# Patient Record
Sex: FEMALE | Race: BLACK OR AFRICAN AMERICAN | Employment: FULL TIME | RURAL
[De-identification: names, ages, dates, MRNs, and addresses within clinical notes are randomized per-mention and may not be internally consistent; named-entity substitution may affect disease eponyms.]

---

## 2018-11-12 ENCOUNTER — OFFICE VISIT (OUTPATIENT)
Dept: FAMILY MEDICINE CLINIC | Age: 28
End: 2018-11-12

## 2018-11-12 VITALS
OXYGEN SATURATION: 99 % | TEMPERATURE: 98.3 F | DIASTOLIC BLOOD PRESSURE: 66 MMHG | HEART RATE: 76 BPM | WEIGHT: 190 LBS | BODY MASS INDEX: 30.53 KG/M2 | HEIGHT: 66 IN | RESPIRATION RATE: 16 BRPM | SYSTOLIC BLOOD PRESSURE: 114 MMHG

## 2018-11-12 DIAGNOSIS — Z76.89 ENCOUNTER TO ESTABLISH CARE: ICD-10-CM

## 2018-11-12 DIAGNOSIS — L50.9 URTICARIA: Primary | ICD-10-CM

## 2018-11-12 DIAGNOSIS — Z00.00 ROUTINE PHYSICAL EXAMINATION: ICD-10-CM

## 2018-11-12 NOTE — PATIENT INSTRUCTIONS
Hives: Care Instructions Your Care Instructions Hives are raised, red, itchy patches of skin. They are also called wheals or welts. They usually have red borders and pale centers. Hives range in size from ¼ inch to 3 inches or more across. They may seem to move from place to place on the skin. Several hives may form a large area of raised, red skin. You can get hives after an insect sting, after taking medicine or eating certain foods, or because of infection or stress. Other causes include plants, things you breathe in, makeup, heat, cold, sunlight, and latex. You cannot spread hives to other people. Hives may last a few minutes or a few days, but a single spot may last less than 36 hours. Follow-up care is a key part of your treatment and safety. Be sure to make and go to all appointments, and call your doctor if you are having problems. It's also a good idea to know your test results and keep a list of the medicines you take. How can you care for yourself at home? · Avoid whatever you think may have caused your hives, such as a certain food or medicine. However, you may not know the cause. · Put a cool, wet towel on the area to relieve itching. · Take an over-the-counter antihistamine, such as diphenhydramine (Benadryl), cetirizine (Zyrtec), or loratadine (Claritin), to help stop the hives and calm the itching. Read and follow directions on the label. These medicines can make you feel sleepy. Do not drive while using them. · Stay away from strong soaps, detergents, and chemicals. These can make itching worse. When should you call for help? Call 911 anytime you think you may need emergency care. For example, call if: 
  · You have symptoms of a severe allergic reaction. These may include: 
? Sudden raised, red areas (hives) all over your body. ? Swelling of the throat, mouth, lips, or tongue. ? Trouble breathing. ? Passing out (losing consciousness).  Or you may feel very lightheaded or suddenly feel weak, confused, or restless.  
 Call your doctor now or seek immediate medical care if: 
  · You have symptoms of an allergic reaction, such as: ? A rash or hives (raised, red areas on the skin). ? Itching. ? Swelling. ? Belly pain, nausea, or vomiting.  
  · You get hives after you start a new medicine.  
  · Hives have not gone away after 24 hours.  
 Watch closely for changes in your health, and be sure to contact your doctor if: 
  · You do not get better as expected. Where can you learn more? Go to http://mason-rosa.info/. Enter K926 in the search box to learn more about \"Hives: Care Instructions. \" Current as of: November 20, 2017 Content Version: 11.8 © 4447-8542 [employer]. Care instructions adapted under license by [employer] (which disclaims liability or warranty for this information). If you have questions about a medical condition or this instruction, always ask your healthcare professional. [address] any warranty or liability for your use of this information.

## 2018-11-12 NOTE — PROGRESS NOTES
Identified pt with two pt identifiers(name and ). Chief Complaint Patient presents with  New Patient Establishing Care  Hives 4 wks ago was seen in ER for hives and reports hives have continued - found out she was pregnant approx 3 wks ago - cannot see dermatologist until she has completed medication, however, she cannot come off of medication as sx continue Health Maintenance Due Topic  DTaP/Tdap/Td series (1 - Tdap)  PAP AKA CERVICAL CYTOLOGY  Influenza Age 5 to Adult Wt Readings from Last 3 Encounters:  
18 190 lb (86.2 kg) 02/26/15 161 lb (73 kg) Temp Readings from Last 3 Encounters:  
18 98.3 °F (36.8 °C) (Oral) 02/26/15 98.1 °F (36.7 °C) BP Readings from Last 3 Encounters:  
18 114/66  
02/26/15 101/56 Pulse Readings from Last 3 Encounters:  
18 76  
02/26/15 (!) 106 Learning Assessment: 
:  
 
Learning Assessment 2018 PRIMARY LEARNER Patient HIGHEST LEVEL OF EDUCATION - PRIMARY LEARNER  4 YEARS OF COLLEGE  
BARRIERS PRIMARY LEARNER NONE  
CO-LEARNER CAREGIVER No  
PRIMARY LANGUAGE ENGLISH  
LEARNER PREFERENCE PRIMARY DEMONSTRATION  
  LISTENING  
  READING  
ANSWERED BY patient RELATIONSHIP SELF Depression Screening: 
:  
 
PHQ over the last two weeks 2018 Little interest or pleasure in doing things Not at all Feeling down, depressed, irritable, or hopeless Not at all Total Score PHQ 2 0 Fall Risk Assessment: 
:  
 
No flowsheet data found. Abuse Screening: 
:  
 
Abuse Screening Questionnaire 2018 Do you ever feel afraid of your partner? Chris Turner Are you in a relationship with someone who physically or mentally threatens you? Chris Turner Is it safe for you to go home? Lam Glass Coordination of Care Questionnaire: 
:  
 
1) Have you been to an emergency room, urgent care clinic since your last visit? Yes, was seen for hives at [facility] 4 wks ago Hospitalized since your last visit? no          
 
2) Have you seen or consulted any other health care providers outside of [address] [place] [address] since your last visit? no  (Include any pap smears or colon screenings in this section.) 3) Do you have an Advance Directive on file? no 
Are you interested in receiving information about Advance Directives? no 
 
Patient is accompanied by self. Reviewed record in preparation for visit and have obtained necessary documentation. Medication reconciliation up to date and corrected with patient at this time.

## 2018-11-12 NOTE — PROGRESS NOTES
Subjective:  
  
Sheeba Edwards is a 25 y.o. female with no significant PMHx here with complaint of urticaria. Urticaria: onset was approximately 4 weeks ago. Recently seen at [facility] ED for which she was Rx with steroid, Zantac, and anti-itch cream. She is currently taking Zyrtec daily.  
- Reports that Allergist will not see until she is off medication - No exacerbating factors reported - Denies fever, chills, nausea, vomiting, dyspnea, cough She is approximately 6 weeks pregnant, G1, initial OB visit scheduled for 11/29/18 with Dr. Yakov Desai. Current Outpatient Medications Medication Sig Dispense Refill  Cetirizine (ZYRTEC) 10 mg cap Take 10 mg by mouth daily as needed. No Known Allergies Past medical history - reviewed. Past Medical History:  
Diagnosis Date  Chlamydia 10/11/2011  Hives  Hives Social history - reviewed. Social History Tobacco Use  Smoking status: Never Smoker  Smokeless tobacco: Never Used Substance Use Topics  Alcohol use: Yes Comment: occasionally prior to pregnancy Family history - reviewed. Family History Problem Relation Age of Onset  Hypertension Mother  Hypertension Maternal Grandmother  No Known Problems Father  Hypertension Maternal Grandfather Review of Systems Pertinent items are noted in HPI. Objective:  
 
Visit Vitals /66 (BP 1 Location: Right arm, BP Patient Position: Sitting) Pulse 76 Temp 98.3 °F (36.8 °C) (Oral) Resp 16 Ht 5' 6\" (1.676 m) Wt 190 lb (86.2 kg) LMP 10/01/2018 (Exact Date) SpO2 99% BMI 30.67 kg/m² General appearance - alert, well appearing, and in no distress Eyes - pupils equal and reactive, extraocular eye movements intact, sclera anicteric Oropharyngx - mucous membranes moist, pharynx normal without lesions Neck - supple, no significant adenopathy, thyroid exam: thyroid is normal in size without nodules or tenderness Chest - clear to auscultation, no wheezes, rales or rhonchi, symmetric air entry, no tachypnea, retractions or cyanosis Heart - normal rate, regular rhythm, normal S1, S2, no murmurs, rubs, clicks or gallops Abdomen - soft, nontender, nondistended, no masses or organomegaly, bowel sounds normal 
Neurologic - alert, oriented, normal speech, no focal findings or movement disorder noted Skin - normal coloration and turgor, no rashes, no suspicious skin lesions noted Mental Status - alert, oriented to person, place, and time, normal mood, behavior, speech, dress, motor activity, and thought processes Assessment/Plan:  
Stanley Fontana is a 25 y.o. female seen for: 1. Urticaria: unknown etiology, will check labs as below. Recommend that she see Allergist for further evaluation.  
- CBC W/O DIFF 
- METABOLIC PANEL, COMPREHENSIVE 2. Routine physical examination 3. Encounter to establish care I have discussed the diagnosis with the patient and the intended plan as seen in the above orders. The patient has received an after-visit summary and questions were answered concerning future plans. I have discussed medication side effects and warnings with the patient as well. Patient verbalizes understanding of plan of care and denies further questions or concerns at this time. Informed patient to return to the office if symptoms worsen or if new symptoms arise. Follow-up Disposition: 
Return if symptoms worsen or fail to improve.

## 2018-11-13 LAB
ALBUMIN SERPL-MCNC: 4.4 G/DL (ref 3.5–5.5)
ALBUMIN/GLOB SERPL: 1.5 {RATIO} (ref 1.2–2.2)
ALP SERPL-CCNC: 51 IU/L (ref 39–117)
ALT SERPL-CCNC: 12 IU/L (ref 0–32)
AST SERPL-CCNC: 18 IU/L (ref 0–40)
BILIRUB SERPL-MCNC: 0.9 MG/DL (ref 0–1.2)
BUN SERPL-MCNC: 8 MG/DL (ref 6–20)
BUN/CREAT SERPL: 11 (ref 9–23)
CALCIUM SERPL-MCNC: 9.7 MG/DL (ref 8.7–10.2)
CHLORIDE SERPL-SCNC: 99 MMOL/L (ref 96–106)
CO2 SERPL-SCNC: 22 MMOL/L (ref 20–29)
CREAT SERPL-MCNC: 0.7 MG/DL (ref 0.57–1)
ERYTHROCYTE [DISTWIDTH] IN BLOOD BY AUTOMATED COUNT: 14.6 % (ref 12.3–15.4)
GLOBULIN SER CALC-MCNC: 3 G/DL (ref 1.5–4.5)
GLUCOSE SERPL-MCNC: 85 MG/DL (ref 65–99)
HCT VFR BLD AUTO: 36.9 % (ref 34–46.6)
HGB BLD-MCNC: 12.2 G/DL (ref 11.1–15.9)
MCH RBC QN AUTO: 29 PG (ref 26.6–33)
MCHC RBC AUTO-ENTMCNC: 33.1 G/DL (ref 31.5–35.7)
MCV RBC AUTO: 88 FL (ref 79–97)
PLATELET # BLD AUTO: 290 X10E3/UL (ref 150–379)
POTASSIUM SERPL-SCNC: 4.1 MMOL/L (ref 3.5–5.2)
PROT SERPL-MCNC: 7.4 G/DL (ref 6–8.5)
RBC # BLD AUTO: 4.2 X10E6/UL (ref 3.77–5.28)
SODIUM SERPL-SCNC: 135 MMOL/L (ref 134–144)
WBC # BLD AUTO: 9.3 X10E3/UL (ref 3.4–10.8)